# Patient Record
Sex: MALE | Race: WHITE | Employment: FULL TIME | URBAN - METROPOLITAN AREA
[De-identification: names, ages, dates, MRNs, and addresses within clinical notes are randomized per-mention and may not be internally consistent; named-entity substitution may affect disease eponyms.]

---

## 2018-04-16 ENCOUNTER — HOSPITAL ENCOUNTER (EMERGENCY)
Age: 73
Discharge: ANOTHER HOSPITAL | End: 2018-04-16
Attending: EMERGENCY MEDICINE
Payer: SELF-PAY

## 2018-04-16 ENCOUNTER — APPOINTMENT (OUTPATIENT)
Dept: CT IMAGING | Age: 73
End: 2018-04-16
Attending: PHYSICIAN ASSISTANT
Payer: SELF-PAY

## 2018-04-16 ENCOUNTER — APPOINTMENT (OUTPATIENT)
Dept: GENERAL RADIOLOGY | Age: 73
End: 2018-04-16
Attending: PHYSICIAN ASSISTANT
Payer: SELF-PAY

## 2018-04-16 VITALS
HEART RATE: 62 BPM | DIASTOLIC BLOOD PRESSURE: 60 MMHG | RESPIRATION RATE: 18 BRPM | BODY MASS INDEX: 32.58 KG/M2 | OXYGEN SATURATION: 97 % | SYSTOLIC BLOOD PRESSURE: 115 MMHG | HEIGHT: 69 IN | WEIGHT: 220 LBS

## 2018-04-16 DIAGNOSIS — S42.112A CLOSED DISPLACED FRACTURE OF BODY OF LEFT SCAPULA, INITIAL ENCOUNTER: Primary | ICD-10-CM

## 2018-04-16 LAB
ANION GAP BLD CALC-SCNC: 14 MMOL/L (ref 10–20)
APTT PPP: 22.4 SEC (ref 22.1–32)
BASOPHILS # BLD: 0 K/UL (ref 0–0.1)
BASOPHILS NFR BLD: 0 % (ref 0–1)
BUN BLD-MCNC: 24 MG/DL (ref 9–20)
CA-I BLD-MCNC: 1.13 MMOL/L (ref 1.12–1.32)
CHLORIDE BLD-SCNC: 104 MMOL/L (ref 98–107)
CK SERPL-CCNC: 762 U/L (ref 39–308)
CO2 BLD-SCNC: 28 MMOL/L (ref 21–32)
CREAT BLD-MCNC: 1.3 MG/DL (ref 0.6–1.3)
DIFFERENTIAL METHOD BLD: ABNORMAL
EOSINOPHIL # BLD: 0 K/UL (ref 0–0.4)
EOSINOPHIL NFR BLD: 0 % (ref 0–7)
ERYTHROCYTE [DISTWIDTH] IN BLOOD BY AUTOMATED COUNT: 12.6 % (ref 11.5–14.5)
GLUCOSE BLD-MCNC: 108 MG/DL (ref 65–100)
HCT VFR BLD AUTO: 43.9 % (ref 36.6–50.3)
HCT VFR BLD CALC: 46 % (ref 36.6–50.3)
HGB BLD-MCNC: 14.4 G/DL (ref 12.1–17)
IMM GRANULOCYTES # BLD: 0.1 K/UL (ref 0–0.04)
IMM GRANULOCYTES NFR BLD AUTO: 0 % (ref 0–0.5)
INR PPP: 1.1 (ref 0.9–1.1)
LYMPHOCYTES # BLD: 1.5 K/UL (ref 0.8–3.5)
LYMPHOCYTES NFR BLD: 9 % (ref 12–49)
MCH RBC QN AUTO: 31.4 PG (ref 26–34)
MCHC RBC AUTO-ENTMCNC: 32.8 G/DL (ref 30–36.5)
MCV RBC AUTO: 95.9 FL (ref 80–99)
MONOCYTES # BLD: 1.2 K/UL (ref 0–1)
MONOCYTES NFR BLD: 7 % (ref 5–13)
NEUTS SEG # BLD: 13.7 K/UL (ref 1.8–8)
NEUTS SEG NFR BLD: 83 % (ref 32–75)
NRBC # BLD: 0 K/UL (ref 0–0.01)
NRBC BLD-RTO: 0 PER 100 WBC
PLATELET # BLD AUTO: 200 K/UL (ref 150–400)
PMV BLD AUTO: 11 FL (ref 8.9–12.9)
POTASSIUM BLD-SCNC: 3.7 MMOL/L (ref 3.5–5.1)
PROTHROMBIN TIME: 10.6 SEC (ref 9–11.1)
RBC # BLD AUTO: 4.58 M/UL (ref 4.1–5.7)
SERVICE CMNT-IMP: ABNORMAL
SODIUM BLD-SCNC: 140 MMOL/L (ref 136–145)
THERAPEUTIC RANGE,PTTT: NORMAL SECS (ref 58–77)
WBC # BLD AUTO: 16.6 K/UL (ref 4.1–11.1)

## 2018-04-16 PROCEDURE — 73552 X-RAY EXAM OF FEMUR 2/>: CPT

## 2018-04-16 PROCEDURE — 85610 PROTHROMBIN TIME: CPT | Performed by: EMERGENCY MEDICINE

## 2018-04-16 PROCEDURE — 74011250636 HC RX REV CODE- 250/636: Performed by: PHYSICIAN ASSISTANT

## 2018-04-16 PROCEDURE — 85730 THROMBOPLASTIN TIME PARTIAL: CPT | Performed by: EMERGENCY MEDICINE

## 2018-04-16 PROCEDURE — 36415 COLL VENOUS BLD VENIPUNCTURE: CPT | Performed by: PHYSICIAN ASSISTANT

## 2018-04-16 PROCEDURE — 90471 IMMUNIZATION ADMIN: CPT

## 2018-04-16 PROCEDURE — 73030 X-RAY EXAM OF SHOULDER: CPT

## 2018-04-16 PROCEDURE — 90715 TDAP VACCINE 7 YRS/> IM: CPT | Performed by: PHYSICIAN ASSISTANT

## 2018-04-16 PROCEDURE — 74011636320 HC RX REV CODE- 636/320: Performed by: RADIOLOGY

## 2018-04-16 PROCEDURE — 99285 EMERGENCY DEPT VISIT HI MDM: CPT

## 2018-04-16 PROCEDURE — 96375 TX/PRO/DX INJ NEW DRUG ADDON: CPT

## 2018-04-16 PROCEDURE — 85025 COMPLETE CBC W/AUTO DIFF WBC: CPT | Performed by: PHYSICIAN ASSISTANT

## 2018-04-16 PROCEDURE — 71260 CT THORAX DX C+: CPT

## 2018-04-16 PROCEDURE — A4565 SLINGS: HCPCS

## 2018-04-16 PROCEDURE — 96361 HYDRATE IV INFUSION ADD-ON: CPT

## 2018-04-16 PROCEDURE — 96374 THER/PROPH/DIAG INJ IV PUSH: CPT

## 2018-04-16 PROCEDURE — 70450 CT HEAD/BRAIN W/O DYE: CPT

## 2018-04-16 PROCEDURE — 80047 BASIC METABLC PNL IONIZED CA: CPT

## 2018-04-16 PROCEDURE — 73200 CT UPPER EXTREMITY W/O DYE: CPT

## 2018-04-16 PROCEDURE — 72125 CT NECK SPINE W/O DYE: CPT

## 2018-04-16 PROCEDURE — 82550 ASSAY OF CK (CPK): CPT | Performed by: PHYSICIAN ASSISTANT

## 2018-04-16 RX ORDER — ONDANSETRON 2 MG/ML
4 INJECTION INTRAMUSCULAR; INTRAVENOUS
Status: COMPLETED | OUTPATIENT
Start: 2018-04-16 | End: 2018-04-16

## 2018-04-16 RX ORDER — LISINOPRIL AND HYDROCHLOROTHIAZIDE 12.5; 2 MG/1; MG/1
TABLET ORAL DAILY
COMMUNITY

## 2018-04-16 RX ORDER — ASPIRIN 325 MG
325 TABLET ORAL DAILY
COMMUNITY

## 2018-04-16 RX ORDER — HYDROMORPHONE HYDROCHLORIDE 2 MG/ML
1 INJECTION, SOLUTION INTRAMUSCULAR; INTRAVENOUS; SUBCUTANEOUS
Status: COMPLETED | OUTPATIENT
Start: 2018-04-16 | End: 2018-04-16

## 2018-04-16 RX ORDER — LEVOTHYROXINE SODIUM 100 UG/1
TABLET ORAL
COMMUNITY

## 2018-04-16 RX ADMIN — ONDANSETRON 4 MG: 2 INJECTION INTRAMUSCULAR; INTRAVENOUS at 16:47

## 2018-04-16 RX ADMIN — IOPAMIDOL 95 ML: 612 INJECTION, SOLUTION INTRAVENOUS at 17:24

## 2018-04-16 RX ADMIN — HYDROMORPHONE HYDROCHLORIDE 1 MG: 2 INJECTION INTRAMUSCULAR; INTRAVENOUS; SUBCUTANEOUS at 16:46

## 2018-04-16 RX ADMIN — TETANUS TOXOID, REDUCED DIPHTHERIA TOXOID AND ACELLULAR PERTUSSIS VACCINE, ADSORBED 0.5 ML: 5; 2.5; 8; 8; 2.5 SUSPENSION INTRAMUSCULAR at 19:42

## 2018-04-16 NOTE — ED NOTES
Ambulation unsuccessful, even with x2 staff assist. Sling was placed and pt was assisted into a sitting position on the side of bed, but he was unable to stand due to increased pain. Pt also reported leg pain/stiffness/weakness. Swelling noted to the right upper leg area.

## 2018-04-16 NOTE — ED TRIAGE NOTES
Pt presents via EMS for left shoulder pain. Pt was unloading a truck, when something fell on his left shoulder and knocked him to the ground. Pt denies any loss of consciousness. Pt also reports pain in the back of his head and his knees and groin.

## 2018-04-16 NOTE — ED NOTES
Updated report called to receiving facility/RN  Transport Team arrived at *** to assume care and transport of patient. PT is being transported to *** by ***    Pt is *** hemodynamically stable at this time, Pt is alert and oriented x ***, Neuro *** at baseline, Respiratory status is *** WDL. There are *** changes to previous nursing assesments at this time. Transport team confirmed understanding of need for *** enroute. Pt initial complaint has *** improved. Education provided to patient regarding transportation and risks/expectations. Transportation team introduced to patient at the bedside. Pt family/Friends *** present and family/friend will *** be riding with patient. Final set of vital signs taken. Report given to transport team via SBAR, MAR, HX, and Recent results. Opportunity for questions/clarification provided. Pt placed on stretcher at ***, transport team departed with patient at ***.       Patient Vitals for the past 4 hrs:   BP SpO2   04/16/18 1800 145/75 95 %   04/16/18 1739 123/57 97 %

## 2018-04-16 NOTE — ED PROVIDER NOTES
HPI Comments: 74 y/o male with PMHx of HTN, presenting with complaint of left shoulder pain and bilateral thigh pain. 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 No LOC. He complains of 9/10 pain in his left shoulder, as well as some pain in bilateral anterior thighs. Aggravating factors include movement; he has not tried anything to relieve the pain. He reports a wound on the back of his head, but denies headache, neck pain, back pain, chest pain, shortness of breath, abdominal pain, N/V, weakness, numbness, light-headedness or syncope. The history is provided by the patient and a friend. Past Medical History:   Diagnosis Date    Hypertension        History reviewed. No pertinent surgical history. History reviewed. No pertinent family history. Social History     Social History    Marital status: N/A     Spouse name: N/A    Number of children: N/A    Years of education: N/A     Occupational History    Not on file. Social History Main Topics    Smoking status: Never Smoker    Smokeless tobacco: Never Used    Alcohol use No    Drug use: No    Sexual activity: Not on file     Other Topics Concern    Not on file     Social History Narrative    No narrative on file         ALLERGIES: Review of patient's allergies indicates no known allergies. Review of Systems   Constitutional: Negative for chills and fever. Eyes: Negative for visual disturbance. Respiratory: Negative for shortness of breath. Cardiovascular: Negative for chest pain. Gastrointestinal: Negative for abdominal pain, nausea and vomiting. Musculoskeletal: Positive for arthralgias (left shoulder pain). Negative for back pain and neck pain.        + bilateral thigh pain   Skin: Positive for wound. Neurological: Negative for syncope, weakness, light-headedness and numbness.    All other systems reviewed and are negative. Vitals:    04/16/18 1233   BP: 113/54   Pulse: 62   Resp: 18   SpO2: 100%   Weight: 99.8 kg (220 lb)   Height: 5' 9\" (1.753 m)            Physical Exam   Constitutional: He is oriented to person, place, and time. He appears well-developed and well-nourished. No distress. HENT:   Head: Normocephalic. Small abrasion noted on posterior scalp, no active bleeding. No laceration or contusion. Eyes: Conjunctivae and EOM are normal.   Cardiovascular: Normal rate, regular rhythm and normal heart sounds. Pulmonary/Chest: Effort normal and breath sounds normal.   Abdominal: Soft. He exhibits no distension. There is no tenderness. Musculoskeletal:   Left shoulder with generalized tenderness to palpation, no appreciable asymmetry. No tenderness of upper arm, elbow, forearm, wrist or hand. No tenderness of cervical spine. Anterior aspect of mid-thighs with tenderness to palpation bilaterally. No tenderness of hips, proximal thighs, distal thighs, knees or lower legs. Neurological: He is alert and oriented to person, place, and time. Skin: Skin is warm and dry. He is not diaphoretic. Nursing note and vitals reviewed. MDM  Number of Diagnoses or Management Options     Amount and/or Complexity of Data Reviewed  Clinical lab tests: ordered and reviewed  Tests in the radiology section of CPT®: ordered and reviewed  Discuss the patient with other providers: yes (Dr. Kay Willis, ED attending)  Independent visualization of images, tracings, or specimens: yes (XR left shoulder, XR right femur, XR left femur)    Patient Progress  Patient progress: stable        ED Course       Procedures      74 y/o male with PMHx of HTN, presenting with complaint of left shoulder pain and bilateral thigh pain. XR left shoulder reveals comminuted scapular fracture.  XR bilateral femurs negative for fractures, CT head without evidence of acute intracranial injury, CT cervical spine negative for fractures or traumatic malalignment. CT left upper extremity and CT chest w/ contrast obtained and reveals comminuted scapular fracture but no evidence of rib fractures or other intrathoracic injuries. Pain somewhat improved after pain medication but patient remains very uncomfortable, unable to walk due to muscular pain in thighs. Will consult the hospitalist service for admission.    -transfer for crush injury    Discussed with Dr. Valentin Bliss, hospitalist and Dr. Nannette Vazquez, surgery. Both recommend transfer for trauma. CONSULT NOTE:  7:15 PM Jeffrey Bateman MD spoke with Dr. Kristin Nunez, Consult for Trauma service at [address] [place] [address].  Discussed available diagnostic tests and clinical findings. Dr. Kristin Nunez will admit pt. CONSULT NOTE:  7:18 PM Jeffrey Bateman MD spoke with Dr. Marina Mathew, Consult for ED at [address] [place] [address].  Discussed available diagnostic tests and clinical findings. Dr. Marina Mathew accepts pt for transfer.

## 2018-04-16 NOTE — ED NOTES
Assisted patient into hospital gown and removed all clothing. Pt voided in urinal. Yellow Fall risk socks applied. Pt back in stretcher on monitor x2.

## 2018-04-17 NOTE — ROUTINE PROCESS
Updated report called to receiving facility/RN  Transport Team arrived at 2000 to assume care and transport of patient. PT is being transported to [facility] by [facility]. Pt is is hemodynamically stable at this time, Pt is alert and oriented x 4, Neuro negative at baseline, Respiratory status is WDL. There are no changes to previous nursing assesments at this time. Transport team confirmed understanding of need for IV fluids enroute. Pt initial complaint has improved. Education provided to patient regarding transportation and risks/expectations. Transportation team introduced to patient at the bedside. Pt family/Friends are present. Final set of vital signs taken. Report given to transport team via SBAR, MAR, HX, and Recent results. Opportunity for questions/clarification provided. Pt placed on stretcher at 2015, transport team departed with patient at 2020.       Patient Vitals for the past 4 hrs:   BP SpO2   04/16/18 2001 115/60 97 %   04/16/18 1800 145/75 95 %   04/16/18 1739 123/57 97 %